# Patient Record
Sex: FEMALE | Race: BLACK OR AFRICAN AMERICAN | NOT HISPANIC OR LATINO | URBAN - METROPOLITAN AREA
[De-identification: names, ages, dates, MRNs, and addresses within clinical notes are randomized per-mention and may not be internally consistent; named-entity substitution may affect disease eponyms.]

---

## 2017-03-26 ENCOUNTER — EMERGENCY (EMERGENCY)
Facility: HOSPITAL | Age: 57
LOS: 0 days | Discharge: HOME | End: 2017-03-26
Attending: EMERGENCY MEDICINE
Payer: MEDICAID

## 2017-03-26 VITALS
HEART RATE: 76 BPM | RESPIRATION RATE: 17 BRPM | HEIGHT: 64 IN | SYSTOLIC BLOOD PRESSURE: 166 MMHG | DIASTOLIC BLOOD PRESSURE: 81 MMHG | TEMPERATURE: 98 F | WEIGHT: 190.04 LBS

## 2017-03-26 DIAGNOSIS — S46.911A STRAIN OF UNSPECIFIED MUSCLE, FASCIA AND TENDON AT SHOULDER AND UPPER ARM LEVEL, RIGHT ARM, INITIAL ENCOUNTER: ICD-10-CM
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
## 2017-03-26 DIAGNOSIS — M25.511 PAIN IN RIGHT SHOULDER: ICD-10-CM
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
## 2017-03-26 DIAGNOSIS — I10 ESSENTIAL (PRIMARY) HYPERTENSION: ICD-10-CM

## 2017-03-26 PROCEDURE — 99284 EMERGENCY DEPT VISIT MOD MDM: CPT

## 2017-03-26 PROCEDURE — 73030 X-RAY EXAM OF SHOULDER: CPT | Mod: 26,RT

## 2017-03-26 RX ORDER — CYCLOBENZAPRINE HYDROCHLORIDE 10 MG/1
1 TABLET, FILM COATED ORAL
Qty: 10 | Refills: 0 | OUTPATIENT
Start: 2017-03-26 | End: 2017-03-29

## 2017-03-26 NOTE — ED PROVIDER NOTE - OBJECTIVE STATEMENT
56F history of HTN here with right shoulder pain x 2 weeks, no trauma, worse with lifting and movement. No chest pain or shortness of breath. No numbness or tingling. No fever, chills, nausea, vomiting. No change in function.

## 2017-03-26 NOTE — ED PROVIDER NOTE - ATTENDING CONTRIBUTION TO CARE
Patient evaluated and seen with GLENIS Moore agree with above history and physical - pt examined and seen by me personally - findings as seen:  pt with shoulder pain on R - otherwise ROM intact with mild limitation for elevation, otherwise no swelling, dc with follow up with PMD for possible MRI if pain is persistent.

## 2019-09-05 NOTE — ED ADULT TRIAGE NOTE - HEIGHT IN FEET
"Prescription approved per [facility] Refill Protocol.  Yuliya EMERY RN    Last Written Prescription Date:  12/13/2018  Last Fill Quantity: 48,  # refills: 1   Last office visit: No previous visit found with prescribing provider:     Future Office Visit:   Next 5 appointments (look out 90 days)    Sep 16, 2019 11:00 AM CDT  Return Visit with Bisi Poon  [facility] ([facility] [address] [address]  [phone]   Sep 25, 2019 10:00 AM CDT  MyChart Short with Elio Murrell MD  [facility] ([facility] [address] [address]  [phone]   Oct 02, 2019 11:00 AM CDT  Return Visit with Bisi Poon  [facility] ([facility]) [address] [address]  [phone]         Requested Prescriptions   Pending Prescriptions Disp Refills     fluticasone (FLONASE) 50 MCG/ACT nasal spray 48 g 1       Inhaled Steroids Protocol Passed - 9/4/2019  9:04 PM        Passed - Patient is age 12 or older        Passed - Recent (12 mo) or future (30 days) visit within the authorizing provider's specialty     Patient had office visit in the last 12 months or has a visit in the next 30 days with authorizing provider or within the authorizing provider's specialty.  See \"Patient Info\" tab in inbasket, or \"Choose Columns\" in Meds & Orders section of the refill encounter.              Passed - Medication is active on med list        "
5

## 2023-11-06 NOTE — ED ADULT NURSE NOTE - FALL HARM RISK CONCLUSION
Universal Safety Interventions I have personally seen and examined this patient. I fully participated in the care of this patient. I have made amendments to the documentation where appropriate and otherwise agree with the history, physical exam, and plan as documented by the